# Patient Record
Sex: MALE | Race: WHITE | NOT HISPANIC OR LATINO | URBAN - METROPOLITAN AREA
[De-identification: names, ages, dates, MRNs, and addresses within clinical notes are randomized per-mention and may not be internally consistent; named-entity substitution may affect disease eponyms.]

---

## 2019-07-01 ENCOUNTER — APPOINTMENT (RX ONLY)
Dept: URBAN - METROPOLITAN AREA OTHER 10 | Facility: OTHER | Age: 29
Setting detail: DERMATOLOGY
End: 2019-07-01

## 2019-07-01 DIAGNOSIS — L23.9 ALLERGIC CONTACT DERMATITIS, UNSPECIFIED CAUSE: ICD-10-CM

## 2019-07-01 PROCEDURE — ? COUNSELING

## 2019-07-01 PROCEDURE — ? PRESCRIPTION

## 2019-07-01 PROCEDURE — 99202 OFFICE O/P NEW SF 15 MIN: CPT

## 2019-07-01 RX ORDER — CEPHALEXIN 250 MG/1
CAPSULE ORAL
Qty: 14 | Refills: 0 | Status: ERX | COMMUNITY
Start: 2019-07-01

## 2019-07-01 RX ORDER — HALOBETASOL PROPIONATE OINTMENT 0.5 MG/G
OINTMENT TOPICAL BID
Qty: 50 | Refills: 1 | Status: ERX | COMMUNITY
Start: 2019-07-01

## 2019-07-01 RX ADMIN — HALOBETASOL PROPIONATE OINTMENT: 0.5 OINTMENT TOPICAL at 14:05

## 2019-07-01 RX ADMIN — CEPHALEXIN: 250 CAPSULE ORAL at 14:06

## 2019-07-01 NOTE — HPI: RASH
What Type Of Note Output Would You Prefer (Optional)?: Bullet Format
How Severe Is Your Rash?: moderate
Is This A New Presentation, Or A Follow-Up?: Rash
Additional History: Patient states this started about 5 years ago, basically when he started having to wear boots.  It’s worse in the summertime